# Patient Record
Sex: FEMALE | Race: OTHER | HISPANIC OR LATINO
[De-identification: names, ages, dates, MRNs, and addresses within clinical notes are randomized per-mention and may not be internally consistent; named-entity substitution may affect disease eponyms.]

---

## 2020-09-23 PROBLEM — Z00.00 ENCOUNTER FOR PREVENTIVE HEALTH EXAMINATION: Status: ACTIVE | Noted: 2020-09-23

## 2020-10-07 ENCOUNTER — APPOINTMENT (OUTPATIENT)
Dept: OBGYN | Facility: CLINIC | Age: 30
End: 2020-10-07

## 2020-10-07 VITALS
WEIGHT: 131 LBS | TEMPERATURE: 97.3 F | HEIGHT: 65 IN | DIASTOLIC BLOOD PRESSURE: 60 MMHG | SYSTOLIC BLOOD PRESSURE: 90 MMHG | BODY MASS INDEX: 21.83 KG/M2

## 2021-01-11 ENCOUNTER — APPOINTMENT (RX ONLY)
Dept: URBAN - METROPOLITAN AREA CLINIC 15 | Facility: CLINIC | Age: 31
Setting detail: DERMATOLOGY
End: 2021-01-11

## 2021-01-11 DIAGNOSIS — Z41.9 ENCOUNTER FOR PROCEDURE FOR PURPOSES OTHER THAN REMEDYING HEALTH STATE, UNSPECIFIED: ICD-10-CM

## 2021-01-11 PROCEDURE — ? RADIESSE INJECTION

## 2021-01-11 PROCEDURE — ? ADDITIONAL NOTES

## 2021-01-11 PROCEDURE — ? MEDICAL CONSULTATION: FILLERS

## 2021-01-11 PROCEDURE — ? BOTOX

## 2021-01-11 ASSESSMENT — LOCATION SIMPLE DESCRIPTION DERM
LOCATION SIMPLE: RIGHT TEMPLE
LOCATION SIMPLE: LEFT EYEBROW

## 2021-01-11 ASSESSMENT — LOCATION DETAILED DESCRIPTION DERM
LOCATION DETAILED: LEFT LATERAL EYEBROW
LOCATION DETAILED: RIGHT MID TEMPLE

## 2021-01-11 ASSESSMENT — LOCATION ZONE DERM: LOCATION ZONE: FACE

## 2021-01-11 NOTE — PROCEDURE: RADIESSE INJECTION
Additional Area 1 Volume In Cc: 0
Detail Level: Zone
Temple Hollows Filler  Volume In Cc: 1.5
Additional Area 1 Location: Buttocks
Map Statement: See [address] for Complete Details
Post-Care Instructions: Patient instructed to apply ice to reduce swelling.
Filler: Radiesse
Expiration Date (Month Year): 2022-03-02
Include Cannula Information In Note?: No
Consent: Written consent obtained. Risks include but not limited to bruising, beading, irregular texture, ulceration, infection, allergic reaction, scar formation, incomplete augmentation, temporary nature, procedural pain.
Procedural Text: The filler was administered to the treatment areas noted above. Hyperdilute Radiesse Technique used to ensure maximum safety.
Number Of Syringes (Required For Inventory): 1
Lot #: [id number]
Price (Use Numbers Only, No Special Characters Or $): [employer]

## 2021-01-11 NOTE — PROCEDURE: BOTOX
L Brow Units: 0
Show Lateral Platysmal Band Units: Yes
Show Ucl Units: No
Lot #: [id number]
Price (Use Numbers Only, No Special Characters Or $): [address] [address]
Post-Care Instructions: Patient instructed to not lie down for 4 hours and limit physical activity for 24 hours. Patient instructed not to travel by airplane for 48 hours.
Expiration Date (Month Year): 10/2023
Consent: Written consent obtained. Risks include but not limited to lid/brow ptosis, bruising, swelling, diplopia, temporary effect, incomplete chemical denervation.
Additional Area 1 Location: chin
Dilution (U/0.1 Cc): 4
Detail Level: Zone

## 2021-04-13 ENCOUNTER — APPOINTMENT (RX ONLY)
Dept: URBAN - METROPOLITAN AREA CLINIC 15 | Facility: CLINIC | Age: 31
Setting detail: DERMATOLOGY
End: 2021-04-13

## 2021-04-13 DIAGNOSIS — Z41.9 ENCOUNTER FOR PROCEDURE FOR PURPOSES OTHER THAN REMEDYING HEALTH STATE, UNSPECIFIED: ICD-10-CM

## 2021-04-13 PROCEDURE — ? BOTOX

## 2021-04-13 PROCEDURE — ? ADDITIONAL NOTES

## 2021-04-13 NOTE — PROCEDURE: ADDITIONAL NOTES
Additional Notes: . \\nBotox on Right Lateral PLATYSMA band: \\n10 U x $18=$180.
Render Risk Assessment In Note?: no
Detail Level: Zone

## 2021-04-13 NOTE — PROCEDURE: BOTOX
Additional Area 6 Units: 0
Show Lateral Platysmal Band Units: Yes
Additional Area 1 Location: RIGHT LATERAL PLATYSMA BANDS
Consent: Written consent obtained. Risks include but not limited to lid/brow ptosis, bruising, swelling, diplopia, temporary effect, incomplete chemical denervation.
Show Right And Left Periorbital Units: No
Detail Level: Zone
Lot #: [id number]
Expiration Date (Month Year): 10/2023
Additional Area 1 Units: 10
Post-Care Instructions: Patient instructed to not lie down for 4 hours and limit physical activity for 24 hours. Patient instructed not to travel by airplane for 48 hours.
Dilution (U/0.1 Cc): 4
Price (Use Numbers Only, No Special Characters Or $): [address] [address]

## 2021-10-25 ENCOUNTER — APPOINTMENT (RX ONLY)
Dept: URBAN - METROPOLITAN AREA CLINIC 15 | Facility: CLINIC | Age: 31
Setting detail: DERMATOLOGY
End: 2021-10-25

## 2021-10-25 DIAGNOSIS — Z41.9 ENCOUNTER FOR PROCEDURE FOR PURPOSES OTHER THAN REMEDYING HEALTH STATE, UNSPECIFIED: ICD-10-CM

## 2021-10-25 PROCEDURE — ? TEOSYAL RHA 2 INJECTION

## 2021-10-25 PROCEDURE — ? COSMETIC CONSULTATION: FILLERS

## 2021-10-25 PROCEDURE — ? BOTOX

## 2021-10-25 ASSESSMENT — LOCATION SIMPLE DESCRIPTION DERM
LOCATION SIMPLE: RIGHT FOREHEAD
LOCATION SIMPLE: LEFT EYELID
LOCATION SIMPLE: LEFT TEMPLE
LOCATION SIMPLE: RIGHT TEMPLE
LOCATION SIMPLE: INFERIOR FOREHEAD
LOCATION SIMPLE: LEFT FOREHEAD
LOCATION SIMPLE: SUPERIOR FOREHEAD
LOCATION SIMPLE: RIGHT EYELID

## 2021-10-25 ASSESSMENT — LOCATION DETAILED DESCRIPTION DERM
LOCATION DETAILED: RIGHT MID TEMPLE
LOCATION DETAILED: RIGHT LATERAL CANTHUS
LOCATION DETAILED: SUPERIOR MID FOREHEAD
LOCATION DETAILED: LEFT LATERAL CANTHUS
LOCATION DETAILED: RIGHT LATERAL FOREHEAD
LOCATION DETAILED: LEFT MID TEMPLE
LOCATION DETAILED: INFERIOR MID FOREHEAD
LOCATION DETAILED: LEFT LATERAL FOREHEAD

## 2021-10-25 ASSESSMENT — LOCATION ZONE DERM
LOCATION ZONE: EYELID
LOCATION ZONE: FACE

## 2021-10-25 NOTE — PROCEDURE: BOTOX
Additional Area 2 Location: masseters
Anterior Platysmal Bands Units: 0
Show Right And Left Brow Units: No
Consent: Written consent obtained. Risks include but not limited to lid/brow ptosis, bruising, swelling, diplopia, temporary effect, incomplete chemical denervation.
Show Additional Area 2: Yes
Forehead Units: 10
Glabellar Complex Units: [facility]
Dilution (U/0.1 Cc): 4
Additional Area 1 Location: chin
Detail Level: Simple
Post-Care Instructions: Patient instructed to not lie down for 4 hours and limit physical activity for 24 hours. Patient instructed not to travel by airplane for 48 hours.
Lot #: [id number]
Periorbital Skin Units: 20
Expiration Date (Month Year): 01/2024

## 2021-10-25 NOTE — PROCEDURE: TEOSYAL RHA 2 INJECTION
Additional Area 5 Volume In Cc: 0
Anesthesia Volume In Cc: 0.5
Price (Use Numbers Only, No Special Characters Or $): [address] [employer]
Include Cannula Length?: 1.5 inch
Include Cannula Brand?: [employer]
Lot #: [id number]
Detail Level: Detailed
Map Statment: See [address] for Complete Details
Procedural Text: The filler was diluted with . 5cccof lidocaine, then administered to the treatment areas noted above.
Filler: Teosyal RHA 2
Anesthesia Type: 1% lidocaine with epinephrine
Consent: Written consent obtained. Risks include but not limited to bruising, beading, irregular texture, ulceration, infection, allergic reaction, scar formation, incomplete augmentation, temporary nature, procedural pain.
Include Cannula Information In Note?: Yes
Include Cannula Size?: 22G
Use Map Statement For Sites (Optional): No
Additional Anesthesia Volume In Cc: 6
Additional Area 1 Location: glabella
Post-Care Instructions: Patient instructed to apply ice to reduce swelling.
Temple Hollows Filler  Volume In Cc: 1

## 2022-06-21 ENCOUNTER — APPOINTMENT (RX ONLY)
Dept: URBAN - METROPOLITAN AREA CLINIC 15 | Facility: CLINIC | Age: 32
Setting detail: DERMATOLOGY
End: 2022-06-21

## 2022-06-21 VITALS — WEIGHT: 150 LBS | HEIGHT: 65 IN

## 2022-06-21 DIAGNOSIS — D22 MELANOCYTIC NEVI: ICD-10-CM

## 2022-06-21 DIAGNOSIS — L82.1 OTHER SEBORRHEIC KERATOSIS: ICD-10-CM

## 2022-06-21 DIAGNOSIS — L21.8 OTHER SEBORRHEIC DERMATITIS: ICD-10-CM

## 2022-06-21 DIAGNOSIS — B36.0 PITYRIASIS VERSICOLOR: ICD-10-CM

## 2022-06-21 PROBLEM — D22.5 MELANOCYTIC NEVI OF TRUNK: Status: ACTIVE | Noted: 2022-06-21

## 2022-06-21 PROBLEM — D22.71 MELANOCYTIC NEVI OF RIGHT LOWER LIMB, INCLUDING HIP: Status: ACTIVE | Noted: 2022-06-21

## 2022-06-21 PROCEDURE — 99213 OFFICE O/P EST LOW 20 MIN: CPT

## 2022-06-21 PROCEDURE — ? PRESCRIPTION

## 2022-06-21 PROCEDURE — ? PRESCRIPTION MEDICATION MANAGEMENT

## 2022-06-21 PROCEDURE — ? COUNSELING

## 2022-06-21 PROCEDURE — ? OBSERVATION

## 2022-06-21 RX ORDER — KETOCONAZOLE 20 MG/G
1 CREAM TOPICAL BID
Qty: 60 | Refills: 3 | Status: ERX | COMMUNITY
Start: 2022-06-21

## 2022-06-21 RX ORDER — KETOCONAZOLE 20 MG/ML
1 SHAMPOO, SUSPENSION TOPICAL
Qty: 120 | Refills: 4 | Status: ERX | COMMUNITY
Start: 2022-06-21

## 2022-06-21 RX ADMIN — KETOCONAZOLE 1: 20 CREAM TOPICAL at 00:00

## 2022-06-21 RX ADMIN — KETOCONAZOLE 1: 20 SHAMPOO, SUSPENSION TOPICAL at 00:00

## 2022-06-21 ASSESSMENT — LOCATION DETAILED DESCRIPTION DERM
LOCATION DETAILED: LEFT SUPERIOR UPPER BACK
LOCATION DETAILED: RIGHT RIB CAGE
LOCATION DETAILED: RIGHT ANTERIOR MEDIAL MALLEOLUS
LOCATION DETAILED: RIGHT SUPERIOR LATERAL LOWER BACK
LOCATION DETAILED: SUPERIOR THORACIC SPINE
LOCATION DETAILED: RIGHT INFRAMAMMARY CREASE (INNER QUADRANT)
LOCATION DETAILED: LEFT INFERIOR CENTRAL MALAR CHEEK
LOCATION DETAILED: RIGHT INFERIOR UPPER BACK
LOCATION DETAILED: RIGHT ANTERIOR MEDIAL MALLEOLUS
LOCATION DETAILED: SUBXIPHOID
LOCATION DETAILED: LEFT MEDIAL BREAST 7-8:00 REGION
LOCATION DETAILED: LEFT MEDIAL BREAST 7-8:00 REGION
LOCATION DETAILED: RIGHT SUPERIOR MEDIAL UPPER BACK
LOCATION DETAILED: LEFT MEDIAL SUPERIOR CHEST
LOCATION DETAILED: RIGHT LATERAL SUPERIOR CHEST
LOCATION DETAILED: LEFT RIB CAGE
LOCATION DETAILED: MID-FRONTAL SCALP
LOCATION DETAILED: LEFT SUPERIOR MEDIAL MIDBACK
LOCATION DETAILED: LEFT BUTTOCK
LOCATION DETAILED: EPIGASTRIC SKIN
LOCATION DETAILED: MID-FRONTAL SCALP
LOCATION DETAILED: MIDDLE STERNUM
LOCATION DETAILED: LEFT SUPERIOR ANTERIOR NECK
LOCATION DETAILED: RIGHT INFERIOR CENTRAL MALAR CHEEK
LOCATION DETAILED: LEFT MEDIAL SUPERIOR CHEST

## 2022-06-21 ASSESSMENT — LOCATION ZONE DERM
LOCATION ZONE: FACE
LOCATION ZONE: LEG
LOCATION ZONE: NECK
LOCATION ZONE: TRUNK
LOCATION ZONE: SCALP
LOCATION ZONE: SCALP
LOCATION ZONE: LEG
LOCATION ZONE: TRUNK

## 2022-06-21 ASSESSMENT — LOCATION SIMPLE DESCRIPTION DERM
LOCATION SIMPLE: LEFT ANTERIOR NECK
LOCATION SIMPLE: RIGHT BREAST
LOCATION SIMPLE: LEFT BREAST
LOCATION SIMPLE: ANTERIOR SCALP
LOCATION SIMPLE: LEFT UPPER BACK
LOCATION SIMPLE: LEFT BREAST
LOCATION SIMPLE: RIGHT UPPER BACK
LOCATION SIMPLE: UPPER BACK
LOCATION SIMPLE: LEFT LOWER BACK
LOCATION SIMPLE: LEFT CHEEK
LOCATION SIMPLE: ABDOMEN
LOCATION SIMPLE: RIGHT LOWER BACK
LOCATION SIMPLE: LEFT BUTTOCK
LOCATION SIMPLE: RIGHT ANKLE
LOCATION SIMPLE: RIGHT CHEEK
LOCATION SIMPLE: ANTERIOR SCALP
LOCATION SIMPLE: CHEST
LOCATION SIMPLE: CHEST
LOCATION SIMPLE: RIGHT ANKLE

## 2022-06-21 NOTE — HPI: FULL BODY SKIN EXAMINATION
How Severe Are Your Spot(S)?: mild
What Is The Reason For Today's Visit?: Full Body Skin Examination
What Is The Reason For Today's Visit? (Being Monitored For X): surveillance against the recurrence of atypical nevi
Additional History: Patient has Hx of atypical nevus and one lesion removed on her back unknown the results. Patient her main concern and is a mole on her left inner thigh that has gotten darker 3 weeks ago.

## 2022-06-21 NOTE — PROCEDURE: PRESCRIPTION MEDICATION MANAGEMENT
Initiate Treatment: .\\n\\nketoconazole 2 % topical cream- Apply to affected area under breast twice a day for 4 weeks
Detail Level: Zone
Render In Strict Bullet Format?: No
Initiate Treatment: .\\n\\nketoconazole 2 % shampoo: Wash scalp 2-3x a week . Apply to scalp, leave one for 5 minutes then rinse. \\n\\nketoconazole 2 % topical cream: Apply to affected area on face  twice a day for 4 weeks

## 2022-08-11 ENCOUNTER — NON-APPOINTMENT (OUTPATIENT)
Age: 32
End: 2022-08-11

## 2022-08-11 ENCOUNTER — APPOINTMENT (OUTPATIENT)
Dept: OBGYN | Facility: CLINIC | Age: 32
End: 2022-08-11

## 2022-08-11 VITALS
BODY MASS INDEX: 26.46 KG/M2 | DIASTOLIC BLOOD PRESSURE: 73 MMHG | SYSTOLIC BLOOD PRESSURE: 111 MMHG | WEIGHT: 159 LBS | HEART RATE: 100 BPM | OXYGEN SATURATION: 97 %

## 2022-08-11 DIAGNOSIS — N91.1 SECONDARY AMENORRHEA: ICD-10-CM

## 2022-08-11 PROCEDURE — 99204 OFFICE O/P NEW MOD 45 MIN: CPT | Mod: 25

## 2022-08-11 PROCEDURE — 76816 OB US FOLLOW-UP PER FETUS: CPT

## 2022-08-11 RX ORDER — FAMCICLOVIR 500 MG/1
500 TABLET, FILM COATED ORAL
Qty: 30 | Refills: 0 | Status: ACTIVE | COMMUNITY
Start: 2022-03-04

## 2022-08-11 RX ORDER — ENOXAPARIN SODIUM 40 MG/.4ML
40 INJECTION, SOLUTION SUBCUTANEOUS
Qty: 12 | Refills: 0 | Status: ACTIVE | COMMUNITY
Start: 2022-06-16

## 2022-08-11 NOTE — PLAN
[FreeTextEntry1] : 33 yo  at 18w1d who presents for transfer of care of pregnancy. \par \par - Viable IUP, FHTs confirmed, and BSUS c/w reported HERNAN\par - Reviewed hx of CVA and PFO; plan for referral for fetal echo, continue lovenox + ASA (with transition to heparin at 36 weeks), and planned  at 39 weeks; patient amenable to this plan\par - MFM consult for further input\par - Pt to send over previous prenatal records\par - Rx for fioricet for migraines\par - Recommend BCM options and pt to consider IUD insertion with \par - Reviewed group practice model and answered questions about do's/don'ts of pregnancy\par \par RTO in 4 weeks.\par \par Sofy Martinez MD\par

## 2022-08-11 NOTE — PROCEDURE
[Transabdominal OB Sonogram] : Transabdominal OB Sonogram [Intrauterine Pregnancy] : intrauterine pregnancy [Date: ___] : Date: [unfilled] [Current GA by Sonogram: ___ (wks)] : Current GA by Sonogram: [unfilled]Uwks [___ day(s)] : [unfilled] days [Transabdominal OB Sonogram WNL] : Transabdominal OB Sonogram WNL

## 2022-08-11 NOTE — COUNSELING
[Nutrition/ Exercise/ Weight Management] : nutrition, exercise, weight management [Contraception/ Emergency Contraception/ Safe Sexual Practices] : contraception, emergency contraception, safe sexual practices [Lab Results] : lab results [Medication Management] : medication management

## 2022-08-11 NOTE — HISTORY OF PRESENT ILLNESS
[Patient reported PAP Smear was normal] : Patient reported PAP Smear was normal [Y] : Positive pregnancy history [PapSmeardate] : 08/2021 [LMPDate] : 04/06/2022 [PGHxTotal] : 3 [PGHxAbortions] : 2 [PGHxABInduced] : 1 [PGHxABSpont] : 1 [FreeTextEntry1] : ETOP x 1, SAB x 1

## 2022-08-11 NOTE — PHYSICAL EXAM
[Chaperone Present] : A chaperone was present in the examining room during all aspects of the physical examination [Appropriately responsive] : appropriately responsive [Alert] : alert [No Acute Distress] : no acute distress [Regular Rate Rhythm] : regular rate rhythm [No Murmurs] : no murmurs [Clear to Auscultation B/L] : clear to auscultation bilaterally [Soft] : soft [Non-tender] : non-tender [Non-distended] : non-distended [No Lesions] : no lesions [Oriented x3] : oriented x3

## 2022-08-12 LAB
ABO + RH PNL BLD: NORMAL
APPEARANCE: CLEAR
BASOPHILS # BLD AUTO: 0.02 K/UL
BASOPHILS NFR BLD AUTO: 0.2 %
BILIRUBIN URINE: NEGATIVE
BLD GP AB SCN SERPL QL: NORMAL
BLOOD URINE: NEGATIVE
C TRACH RRNA SPEC QL NAA+PROBE: NOT DETECTED
COLOR: YELLOW
EOSINOPHIL # BLD AUTO: 0.05 K/UL
EOSINOPHIL NFR BLD AUTO: 0.5 %
GLUCOSE QUALITATIVE U: NEGATIVE
HBV SURFACE AG SER QL: NONREACTIVE
HCT VFR BLD CALC: 36.8 %
HCV AB SER QL: NONREACTIVE
HCV S/CO RATIO: 0.11 S/CO
HGB A MFR BLD: 97.3 %
HGB A2 MFR BLD: 2.7 %
HGB BLD-MCNC: 11.7 G/DL
HGB FRACT BLD-IMP: NORMAL
IMM GRANULOCYTES NFR BLD AUTO: 0.3 %
KETONES URINE: NEGATIVE
LEUKOCYTE ESTERASE URINE: NEGATIVE
LYMPHOCYTES # BLD AUTO: 2.17 K/UL
LYMPHOCYTES NFR BLD AUTO: 20.2 %
MAN DIFF?: NORMAL
MCHC RBC-ENTMCNC: 27.9 PG
MCHC RBC-ENTMCNC: 31.8 GM/DL
MCV RBC AUTO: 87.8 FL
MONOCYTES # BLD AUTO: 0.46 K/UL
MONOCYTES NFR BLD AUTO: 4.3 %
N GONORRHOEA RRNA SPEC QL NAA+PROBE: NOT DETECTED
NEUTROPHILS # BLD AUTO: 8 K/UL
NEUTROPHILS NFR BLD AUTO: 74.5 %
NITRITE URINE: NEGATIVE
PH URINE: 7
PLATELET # BLD AUTO: 282 K/UL
PROTEIN URINE: NEGATIVE
RBC # BLD: 4.19 M/UL
RBC # FLD: 14.2 %
SOURCE AMPLIFICATION: NORMAL
SPECIFIC GRAVITY URINE: 1.01
T PALLIDUM AB SER QL IA: NEGATIVE
TSH SERPL-ACNC: 2.01 UIU/ML
UROBILINOGEN URINE: NORMAL
WBC # FLD AUTO: 10.73 K/UL

## 2022-08-13 ENCOUNTER — TRANSCRIPTION ENCOUNTER (OUTPATIENT)
Age: 32
End: 2022-08-13

## 2022-08-15 DIAGNOSIS — B00.2 HERPESVIRAL GINGIVOSTOMATITIS AND PHARYNGOTONSILLITIS: ICD-10-CM

## 2022-08-15 LAB
B19V IGG SER QL IA: 8.65 INDEX
B19V IGG+IGM SER-IMP: NORMAL
B19V IGG+IGM SER-IMP: POSITIVE
B19V IGM FLD-ACNC: 0.18 INDEX
B19V IGM SER-ACNC: NEGATIVE
CMV IGG SERPL QL: <0.2 U/ML
CMV IGG SERPL-IMP: NEGATIVE
CMV IGM SERPL QL: 9.1 AU/ML
CMV IGM SERPL QL: NEGATIVE
HIV1+2 AB SPEC QL IA.RAPID: NONREACTIVE
HSV 1+2 IGG SER IA-IMP: NEGATIVE
HSV 1+2 IGG SER IA-IMP: POSITIVE
HSV1 IGG SER QL: 39.5 INDEX
HSV2 IGG SER QL: 0.09 INDEX
MEV IGG FLD QL IA: 77.7 AU/ML
MEV IGG+IGM SER-IMP: POSITIVE
MUV AB SER-ACNC: NEGATIVE
MUV IGG SER QL IA: <5 AU/ML
RUBV IGG FLD-ACNC: 2 INDEX
RUBV IGG SER-IMP: POSITIVE
RUBV IGM FLD-ACNC: <20 AU/ML
T GONDII AB SER-IMP: NEGATIVE
T GONDII AB SER-IMP: NEGATIVE
T GONDII IGG SER QL: <3 IU/ML
T GONDII IGM SER QL: <3 AU/ML
VZV AB TITR SER: POSITIVE
VZV IGG SER IF-ACNC: 1303 INDEX

## 2022-08-16 ENCOUNTER — TRANSCRIPTION ENCOUNTER (OUTPATIENT)
Age: 32
End: 2022-08-16

## 2022-08-16 LAB
MEV IGM SER QL: <0.91 ISR
MUV IGM SER QL IA: <0.8 AU
VZV IGM SER IF-ACNC: 1.08 INDEX

## 2022-08-17 ENCOUNTER — TRANSCRIPTION ENCOUNTER (OUTPATIENT)
Age: 32
End: 2022-08-17

## 2022-08-22 LAB
HSV1 IGM SER QL: NEGATIVE
HSV2 AB FLD-ACNC: NEGATIVE

## 2022-08-24 ENCOUNTER — TRANSCRIPTION ENCOUNTER (OUTPATIENT)
Age: 32
End: 2022-08-24

## 2022-09-06 ENCOUNTER — APPOINTMENT (OUTPATIENT)
Dept: MATERNAL FETAL MEDICINE | Facility: CLINIC | Age: 32
End: 2022-09-06

## 2022-09-06 DIAGNOSIS — Z20.822 CONTACT WITH AND (SUSPECTED) EXPOSURE TO COVID-19: ICD-10-CM

## 2022-09-06 PROCEDURE — 99205 OFFICE O/P NEW HI 60 MIN: CPT | Mod: 95

## 2022-09-06 RX ORDER — NIRMATRELVIR AND RITONAVIR 300-100 MG
20 X 150 MG & KIT ORAL
Qty: 1 | Refills: 0 | Status: ACTIVE | COMMUNITY
Start: 2022-09-06 | End: 1900-01-01

## 2022-09-07 ENCOUNTER — NON-APPOINTMENT (OUTPATIENT)
Age: 32
End: 2022-09-07

## 2022-09-08 ENCOUNTER — APPOINTMENT (OUTPATIENT)
Dept: OBGYN | Facility: CLINIC | Age: 32
End: 2022-09-08

## 2022-09-08 DIAGNOSIS — Q21.1 ATRIAL SEPTAL DEFECT: ICD-10-CM

## 2022-09-08 DIAGNOSIS — Z86.73 PERSONAL HISTORY OF TRANSIENT ISCHEMIC ATTACK (TIA), AND CEREBRAL INFARCTION W/OUT RESIDUAL DEFICITS: ICD-10-CM

## 2022-09-12 ENCOUNTER — NON-APPOINTMENT (OUTPATIENT)
Age: 32
End: 2022-09-12

## 2022-09-12 NOTE — HISTORY OF PRESENT ILLNESS
[Home] : at home, [unfilled] , at the time of the visit. [Medical Office: ([facility])___] : at the medical office located in  [Verbal consent obtained from patient] : the patient, [unfilled] [FreeTextEntry1] : Maternal-Fetal Medicine consultation \par Prenatal care: Dr. Martinez\par \par Chief compliant: History of maternal stroke\par \par NICK ROMERO is a 32 year  at 22w0d (HERNAN 1/10/23) that presents for a M consultation due to a h/o stroke. 5 years ago woke up with half her body numb, seen at [facility], dx with ischemic CVA, dx with PFO and incidentally noted cerebral aneurysm. She was taking OCPs at the time. \par \par She was also diagnosed with COVID-19 today and is overall feeling well but reports fevers and fatigue. Denies shortness of breath.\par \par Obstetric history: \par sab x 1, no D&C\par ETOP x 1, D&C\par \par Medical history as above, otherwise no issues. Surgical history notable for PFO closure is otherwise unremarkable, and a "pipeline" embolization for the aneurysm (Dr. Leny Gonzáles now following - [facility]/[place]) last angiogram 2022 and possibly an MRI, rhinoplasty and breast augmentation. Gynecologic history is unremarkable except a remote h/o abnormal paps s/p colp with nl paps since.\par \par Current medications include Lovenox 40 mg daily, aspirin 81 mg daily, prenatal vitamin. Allergies: NKDA \par \par She works remotely as an  and is in school. She lives with her fiance. She denies a history of tobacco use. She denies alcohol or drug use in this pregnancy.  She has never received blood products but is willing to receive them if needed. \par \par Family history is unremarkable. Parent are healthy, sibling  in MVA. Denies h/o MI, stroke, CV disease, DVT or other VTE. She denies a family history of birth defects, mental retardation, developmental delay or genetic disorders. Denies family history of obstetric complications such as preeclampsia, stillbirth or  delivery.\par \par Height: 5'5" Prepregnancy weight: 5'5"\par Prepregnancy BMI: 22.5

## 2022-09-12 NOTE — DISCUSSION/SUMMARY
[FreeTextEntry1] : 1. History of embolic CVA while on OCPs\par Risks and management reviewed. Agree with prophylactic anticoagulation antepartum and 6-8 weeks postpartum. Given negative inherited thrombophilia and APS labs prophylactic dose appears reasonable. Fetal growth surveillance ~4 weeks suggested with increased fetal testing in the late 3rd. Plan for delivery in 39th week unless an indication for earlier delivery arises. \par \par 2. History of cerebral aneurysm\par Called placed to neurosurgeon, awaiting return call. Recommend anesthesia consult in the 3rd trimester.\par \par 3. COVID-19 infection and pregnancy\par Risks and precautions reviewed. Signs and symptoms to call for reviewed. Rx sent for Paxlovid, reviewed risks and benefits, patient will consider taking if sx worsen or new sx develop.\par \par Recommendations:\par 1. Follow-up fetal ultrasound and consult with me in 2-4 weeks\par 2. Follow-up [facility] neurosurgeon to discuss delivery planning.

## 2022-09-12 NOTE — HISTORY OF PRESENT ILLNESS
[Home] : at home, [unfilled] , at the time of the visit. [Medical Office: ([facility])___] : at the medical office located in  [Verbal consent obtained from patient] : the patient, [unfilled] [FreeTextEntry1] : Maternal-Fetal Medicine consultation \par Prenatal care: Dr. Martinez\par \par Chief compliant: History of maternal stroke\par \par NICK ROMERO is a 32 year  at 22w0d (HERNAN 1/10/23) that presents for a M consultation due to a h/o stroke. 5 years ago woke up with half her body numb, seen at [facility], dx with ischemic CVA, dx with PFO and incidentally noted cerebral aneurysm. She was taking OCPs at the time. \par \par She was also diagnosed with COVID-19 today and is overall feeling well but reports fevers and fatigue. Denies shortness of breath.\par \par Obstetric history: \par sab x 1, no D&C\par ETOP x 1, D&C\par \par Medical history as above, otherwise no issues. Surgical history notable for PFO closure is otherwise unremarkable, and a "pipeline" embolization for the aneurysm (Dr. Leny Gonzáles now following - [facility]/[place]) last angiogram 2022 and possibly an MRI, rhinoplasty and breast augmentation. Gynecologic history is unremarkable except a remote h/o abnormal paps s/p colp with nl paps since.\par \par Current medications include Lovenox 40 mg daily, aspirin 81 mg daily, prenatal vitamin. Allergies: NKDA \par \par She works remotely as an  and is in school. She lives with her fiance. She denies a history of tobacco use. She denies alcohol or drug use in this pregnancy.  She has never received blood products but is willing to receive them if needed. \par \par Family history is unremarkable. Parent are healthy, sibling  in MVA. Denies h/o MI, stroke, CV disease, DVT or other VTE. She denies a family history of birth defects, mental retardation, developmental delay or genetic disorders. Denies family history of obstetric complications such as preeclampsia, stillbirth or  delivery.\par \par Height: 5'5" Prepregnancy weight: 5'5"\par Prepregnancy BMI: 22.5

## 2022-09-12 NOTE — DATA REVIEWED
[FreeTextEntry1] : Scanned outside records reviewed\par Negative inherited thrombophilia and antiphospholipid syndrome w/u

## 2022-09-12 NOTE — DISCUSSION/SUMMARY
[FreeTextEntry1] : 1. History of embolic CVA while on OCPs\par Risks and management reviewed. Agree with prophylactic anticoagulation antepartum and 6-8 weeks postpartum. Given negative inherited thrombophilia and APS labs prophylactic dose appears reasonable. Fetal growth surveillance ~4 weeks suggested with increased fetal testing in the late 3rd. Plan for delivery in 39th week unless an indication for earlier delivery arises. \par \par 2. History of cerebral aneurysm\par Called placed to neurosurgeon, awaiting return call. Recommend anesthesia consult in the 3rd trimester.\par \par 3. COVID-19 infection and pregnancy\par Risks and precautions reviewed. Signs and symptoms to call for reviewed. Rx sent for Paxlovid, reviewed risks and benefits, patient will consider taking if sx worsen or new sx develop.\par \par Recommendations:\par 1. Follow-up fetal ultrasound and consult with me in 2-4 weeks\par 2. Follow-up [facility] neurosurgeon to discuss delivery planning.

## 2022-10-07 ENCOUNTER — TRANSCRIPTION ENCOUNTER (OUTPATIENT)
Age: 32
End: 2022-10-07